# Patient Record
Sex: MALE | URBAN - METROPOLITAN AREA
[De-identification: names, ages, dates, MRNs, and addresses within clinical notes are randomized per-mention and may not be internally consistent; named-entity substitution may affect disease eponyms.]

---

## 2017-09-08 ENCOUNTER — OFFICE VISIT (OUTPATIENT)
Dept: ORTHOPEDIC SURGERY | Age: 52
End: 2017-09-08

## 2017-09-08 VITALS
HEIGHT: 74 IN | RESPIRATION RATE: 16 BRPM | TEMPERATURE: 98.4 F | DIASTOLIC BLOOD PRESSURE: 78 MMHG | WEIGHT: 279 LBS | HEART RATE: 76 BPM | OXYGEN SATURATION: 98 % | SYSTOLIC BLOOD PRESSURE: 117 MMHG | BODY MASS INDEX: 35.81 KG/M2

## 2017-09-08 DIAGNOSIS — M70.61 TROCHANTERIC BURSITIS OF RIGHT HIP: ICD-10-CM

## 2017-09-08 DIAGNOSIS — M25.551 RIGHT HIP PAIN: Primary | ICD-10-CM

## 2017-09-08 RX ORDER — CLONIDINE HYDROCHLORIDE 0.1 MG/1
TABLET ORAL 2 TIMES DAILY
COMMUNITY

## 2017-09-08 RX ORDER — GABAPENTIN 300 MG/1
300 CAPSULE ORAL 2 TIMES DAILY
Qty: 60 CAP | Refills: 0 | Status: SHIPPED | OUTPATIENT
Start: 2017-09-08

## 2017-09-08 RX ORDER — AMLODIPINE BESYLATE 5 MG/1
5 TABLET ORAL DAILY
COMMUNITY

## 2017-09-08 RX ORDER — METHOCARBAMOL 500 MG/1
TABLET, FILM COATED ORAL 4 TIMES DAILY
COMMUNITY

## 2017-09-08 RX ORDER — SILDENAFIL 100 MG/1
100 TABLET, FILM COATED ORAL AS NEEDED
COMMUNITY

## 2017-09-08 RX ORDER — ACETAMINOPHEN AND CODEINE PHOSPHATE 300; 30 MG/1; MG/1
1 TABLET ORAL
COMMUNITY

## 2017-09-08 RX ORDER — MONTELUKAST SODIUM 10 MG/1
10 TABLET ORAL DAILY
COMMUNITY

## 2017-09-08 RX ORDER — MELOXICAM 15 MG/1
15 TABLET ORAL DAILY
Qty: 30 TAB | Refills: 0 | Status: SHIPPED | OUTPATIENT
Start: 2017-09-08

## 2017-09-08 RX ORDER — ESCITALOPRAM OXALATE 10 MG/1
10 TABLET ORAL DAILY
COMMUNITY

## 2017-09-08 RX ORDER — LORATADINE 10 MG/1
10 TABLET ORAL
COMMUNITY

## 2017-09-08 NOTE — PROGRESS NOTES
Patient: Delfino Michael                MRN: [id number]       SSN: [id number]  YOB: 1965        AGE: 46 y.o. SEX: male  Body mass index is 35.82 kg/(m^2). PCP: No primary care provider on file. 09/08/17    HISTORY: I had the pleasure of reviewing Mandeep today with regards to his right hip and right lateral pelvis pain. It has been ongoing for really a few years. Some days it is really bad and some days it is not so bad. He takes occasional anti-inflammatories for it. He describes a little bit of numbness involving the right lateral hip area as well. He has no groin pain, no start-up pain, no troubles putting shoes and socks on. He works as a . Some days it can be a little more difficult getting around. He denies fevers or chills. He is otherwise feeling well. He denies shortness of breath or chest pain, and he denies radiculopathy going down the leg. PHYSICAL EXAMINATION:  On examination today, he is a large-statured gentleman, 62, 280 pounds. He has a BMI of 36. He is a very pleasant gentleman. He has a nonantalgic component to the gait. Both hips rotate well including flexion, adduction, and internal rotation without any groin pain. The hips flexors are nontender. There is just mild tenderness over the greater trochanter laterally. He has evidence of meralgia paresthetica on sharp testing. The back is relatively nontender, and the pelvis is stable. It is really quite a benign exam overall today. There is just minimal peripheral edema distally. Straight leg raise is negative. Tib/ant and EHL are 5/5. Sensation is normal to L4-L5 bilaterally. RADIOGRAPHS:  On review of his x-rays and MRI of the hip, which I appreciate, he has just mild degenerative changes involving the hips and the possibility of a labral tear. IMPRESSION:  My overall impression is trochanteric bursitis, quiescent currently, and also meralgia paresthetica. PLAN:  I would recommend anti-inflammatories, physical therapy, and Neurontin as need. No surgery is currently indicated. He is not getting any mechanical symptoms whatsoever with regards to his right hip, and I think eventually, he may require cortisone injection for bursitis. I would be happy to do this for him. We will try with the above-mentioned, and I will see him back in about six weeks for followup assessment. REVIEW OF SYSTEMS:      CON: negative for weight loss, fever  EYE: negative for double vision  ENT: negative for hoarseness  RS:   negative for Tb  GI:    negative for blood in stool  :  negative for blood in urine  Other systems reviewed and noted below. Past Medical History:   Diagnosis Date    Hypertension     Sleep apnea        Family History   Problem Relation Age of Onset    Hypertension Mother     Hypertension Father     Diabetes Maternal Grandmother     Diabetes Paternal Grandmother        Current Outpatient Prescriptions   Medication Sig Dispense Refill    methocarbamol (ROBAXIN) 500 mg tablet Take  by mouth four (4) times daily.  escitalopram oxalate (LEXAPRO) 10 mg tablet Take 10 mg by mouth daily.  acetaminophen-codeine (TYLENOL-CODEINE #3) 300-30 mg per tablet Take 1 Tab by mouth every four (4) hours as needed for Pain.  amLODIPine (NORVASC) 5 mg tablet Take 5 mg by mouth daily.  cloNIDine HCl (CATAPRES) 0.1 mg tablet Take  by mouth two (2) times a day.  loratadine (CLARITIN) 10 mg tablet Take 10 mg by mouth.  montelukast (SINGULAIR) 10 mg tablet Take 10 mg by mouth daily.  sildenafil citrate (VIAGRA) 100 mg tablet Take 100 mg by mouth as needed.  cpap machine kit by Does Not Apply route.          Allergies   Allergen Reactions    Statins-Hmg-Coa Reductase Inhibitors Other (comments)     Causes bone pain       Past Surgical History:   Procedure Laterality Date    HX OTHER SURGICAL      gastric sleeve       Social History     Social History    Marital status: UNKNOWN     Spouse name: N/A    Number of children: N/A    Years of education: N/A     Occupational History    Not on file. Social History Main Topics    Smoking status: Never Smoker    Smokeless tobacco: Never Used    Alcohol use No    Drug use: No    Sexual activity: Not on file     Other Topics Concern    Not on file     Social History Narrative    No narrative on file       Visit Vitals    /78    Pulse 76    Temp 98.4 °F (36.9 °C)    Resp 16    Ht 6' 2\" (1.88 m)    Wt 279 lb (126.6 kg)    SpO2 98%    BMI 35.82 kg/m2         PHYSICAL EXAMINATION:  GENERAL: Alert and oriented x3, in no acute distress, well-developed, well-nourished, afebrile. HEART: No JVD. EYES: No scleral icterus   NECK: No significant lymphadenopathy   LUNGS: No respiratory compromise or indrawing  ABDOMEN: Soft, non-tender, non-distended. Electronically signed by:  Osmel Peterson MD